# Patient Record
Sex: MALE | Race: WHITE | Employment: FULL TIME | URBAN - METROPOLITAN AREA
[De-identification: names, ages, dates, MRNs, and addresses within clinical notes are randomized per-mention and may not be internally consistent; named-entity substitution may affect disease eponyms.]

---

## 2017-04-18 ENCOUNTER — OFFICE VISIT (OUTPATIENT)
Dept: FAMILY MEDICINE | Facility: CLINIC | Age: 32
End: 2017-04-18
Payer: COMMERCIAL

## 2017-04-18 VITALS
SYSTOLIC BLOOD PRESSURE: 138 MMHG | HEIGHT: 73 IN | HEART RATE: 66 BPM | BODY MASS INDEX: 26.71 KG/M2 | DIASTOLIC BLOOD PRESSURE: 87 MMHG | TEMPERATURE: 97.6 F | WEIGHT: 201.5 LBS | OXYGEN SATURATION: 99 %

## 2017-04-18 DIAGNOSIS — B00.9 HSV (HERPES SIMPLEX VIRUS) INFECTION: ICD-10-CM

## 2017-04-18 DIAGNOSIS — N35.9 URETHRAL STRICTURE, UNSPECIFIED STRICTURE TYPE: ICD-10-CM

## 2017-04-18 DIAGNOSIS — Z00.00 ROUTINE GENERAL MEDICAL EXAMINATION AT A HEALTH CARE FACILITY: Primary | ICD-10-CM

## 2017-04-18 DIAGNOSIS — J30.2 SEASONAL ALLERGIC RHINITIS, UNSPECIFIED ALLERGIC RHINITIS TRIGGER: ICD-10-CM

## 2017-04-18 PROBLEM — Z13.6 CARDIOVASCULAR SCREENING; LDL GOAL LESS THAN 160: Status: ACTIVE | Noted: 2017-04-18

## 2017-04-18 PROCEDURE — 99385 PREV VISIT NEW AGE 18-39: CPT | Performed by: PHYSICIAN ASSISTANT

## 2017-04-18 RX ORDER — FLUTICASONE PROPIONATE 50 MCG
1-2 SPRAY, SUSPENSION (ML) NASAL DAILY
Qty: 1 BOTTLE | Refills: 11 | Status: SHIPPED | OUTPATIENT
Start: 2017-04-18

## 2017-04-18 RX ORDER — ACYCLOVIR 400 MG/1
400 TABLET ORAL 2 TIMES DAILY
Qty: 60 TABLET | Refills: 11 | Status: SHIPPED | OUTPATIENT
Start: 2017-04-18 | End: 2019-12-05

## 2017-04-18 RX ORDER — ACYCLOVIR 400 MG/1
400 TABLET ORAL 2 TIMES DAILY
Refills: 99 | COMMUNITY
Start: 2017-03-24 | End: 2017-04-18

## 2017-04-18 RX ORDER — CETIRIZINE HYDROCHLORIDE 10 MG/1
10 TABLET ORAL EVERY EVENING
Qty: 30 TABLET | Refills: 1 | Status: SHIPPED | OUTPATIENT
Start: 2017-04-18

## 2017-04-18 RX ORDER — OLOPATADINE HYDROCHLORIDE 1 MG/ML
1 SOLUTION/ DROPS OPHTHALMIC 2 TIMES DAILY
Qty: 5 ML | Refills: 11 | Status: SHIPPED | OUTPATIENT
Start: 2017-04-18

## 2017-04-18 NOTE — PATIENT INSTRUCTIONS
Preventive Health Recommendations  Male Ages 26 - 39    Yearly exam:             See your health care provider every year in order to  o   Review health changes.   o   Discuss preventive care.    o   Review your medicines if your doctor has prescribed any.    You should be tested each year for STDs (sexually transmitted diseases), if you re at risk.     After age 35, talk to your provider about cholesterol testing. If you are at risk for heart disease, have your cholesterol tested at least every 5 years.     If you are at risk for diabetes, you should have a diabetes test (fasting glucose).  Shots: Get a flu shot each year. Get a tetanus shot every 10 years.     Nutrition:    Eat at least 5 servings of fruits and vegetables daily.     Eat whole-grain bread, whole-wheat pasta and brown rice instead of white grains and rice.     Talk to your provider about Calcium and Vitamin D.     Lifestyle    Exercise for at least 150 minutes a week (30 minutes a day, 5 days a week). This will help you control your weight and prevent disease.     Limit alcohol to one drink per day.     No smoking.     Wear sunscreen to prevent skin cancer.     See your dentist every six months for an exam and cleaning.   Over the count L-Lysine can help reduce flares of herpes.  Also, avoid any supplements that have L-Arginine, as that can make worse.

## 2017-04-18 NOTE — MR AVS SNAPSHOT
After Visit Summary   4/18/2017    Adarsh Wyatt    MRN: [id number]           Patient Information     Date Of Birth          1985        Visit Information        Provider Department      4/18/2017 2:40 PM Noam Bass PA-C [facility]        Today's Diagnoses     Routine general medical examination at a health care facility    -  1    Urethral stricture, unspecified stricture type        Seasonal allergic rhinitis, unspecified allergic rhinitis trigger        HSV (herpes simplex virus) infection          Care Instructions      Preventive Health Recommendations  Male Ages 26 - 39    Yearly exam:             See your health care provider every year in order to  o   Review health changes.   o   Discuss preventive care.    o   Review your medicines if your doctor has prescribed any.    You should be tested each year for STDs (sexually transmitted diseases), if you re at risk.     After age 35, talk to your provider about cholesterol testing. If you are at risk for heart disease, have your cholesterol tested at least every 5 years.     If you are at risk for diabetes, you should have a diabetes test (fasting glucose).  Shots: Get a flu shot each year. Get a tetanus shot every 10 years.     Nutrition:    Eat at least 5 servings of fruits and vegetables daily.     Eat whole-grain bread, whole-wheat pasta and brown rice instead of white grains and rice.     Talk to your provider about Calcium and Vitamin D.     Lifestyle    Exercise for at least 150 minutes a week (30 minutes a day, 5 days a week). This will help you control your weight and prevent disease.     Limit alcohol to one drink per day.     No smoking.     Wear sunscreen to prevent skin cancer.     See your dentist every six months for an exam and cleaning.   Over the count L-Lysine can help reduce flares of herpes.  Also, avoid any supplements that have L-Arginine, as that can make worse.        Follow-ups after your  "visit        Additional Services     UROLOGY ADULT REFERRAL       Your provider has referred you to: [facility]: [facility]  [place] [phone]   [email or web address]    Please be aware that coverage of these services is subject to the terms and limitations of your health insurance plan.  Call member services at your health plan with any benefit or coverage questions.      Please bring the following with you to your appointment:    (1) Any X-Rays, CTs or MRIs which have been performed.  Contact the facility where they were done to arrange for  prior to your scheduled appointment.    (2) List of current medications  (3) This referral request   (4) Any documents/labs given to you for this referral                  Who to contact     If you have questions or need follow up information about today's clinic visit or your schedule please contact [facility] directly at [phone].  Normal or non-critical lab and imaging results will be communicated to you by MyChart, letter or phone within 4 business days after the clinic has received the results. If you do not hear from us within 7 days, please contact the clinic through [employer] or phone. If you have a critical or abnormal lab result, we will notify you by phone as soon as possible.  Submit refill requests through [employer] or call your pharmacy and they will forward the refill request to us. Please allow 3 business days for your refill to be completed.          Additional Information About Your Visit        MyChart Information     [employer] lets you send messages to your doctor, view your test results, renew your prescriptions, schedule appointments and more. To sign up, go to [email or web address] . Click on \"Log in\" on the left side of the screen, which will take you to the Welcome page. Then click on \"Sign up Now\" on the right side of the page.     You will be asked to enter the access code listed below, as well as some personal " "information. Please follow the directions to create your username and password.     Your access code is: [email or web address]  Expires: 2017  3:25 PM     Your access code will  in 90 days. If you need help or a new code, please call your [facility] clinic or [phone].        Care EveryWhere ID     This is your Care EveryWhere ID. This could be used by other organizations to access your [facility] medical records  [id number]        Your Vitals Were     Pulse Temperature Height Pulse Oximetry BMI (Body Mass Index)       66 97.6  F (36.4  C) (Oral) 6' 1\" (1.854 m) 99% 26.58 kg/m2        Blood Pressure from Last 3 Encounters:   17 138/87    Weight from Last 3 Encounters:   17 201 lb 8 oz (91.4 kg)              We Performed the Following     UROLOGY ADULT REFERRAL          Today's Medication Changes          These changes are accurate as of: 17  3:25 PM.  If you have any questions, ask your nurse or doctor.               Start taking these medicines.        Dose/Directions    cetirizine 10 MG tablet   Commonly known as:  zyrTEC   Used for:  Seasonal allergic rhinitis, unspecified allergic rhinitis trigger   Started by:  Noam Bass PA-C        Dose:  10 mg   Take 1 tablet (10 mg) by mouth every evening   Quantity:  30 tablet   Refills:  1       fluticasone 50 MCG/ACT spray   Commonly known as:  FLONASE   Used for:  Seasonal allergic rhinitis, unspecified allergic rhinitis trigger   Started by:  Noam Bass PA-C        Dose:  1-2 spray   Spray 1-2 sprays into both nostrils daily   Quantity:  1 Bottle   Refills:  11       olopatadine 0.1 % ophthalmic solution   Commonly known as:  PATANOL   Used for:  Seasonal allergic rhinitis, unspecified allergic rhinitis trigger   Started by:  Noam Bass PA-C        Dose:  1 drop   Place 1 drop into both eyes 2 times daily   Quantity:  5 mL   Refills:  11            Where to get your medicines      These medications were sent to " [facility]/pharmacy #[id number] - [place], [place] - [address]  [address] [address]     Phone:  [phone]     acyclovir 400 MG tablet    cetirizine 10 MG tablet    fluticasone 50 MCG/ACT spray    olopatadine 0.1 % ophthalmic solution                Primary Care Provider    None Specified       No primary provider on file.        Thank you!     Thank you for choosing [facility]  for your care. Our goal is always to provide you with excellent care. Hearing back from our patients is one way we can continue to improve our services. Please take a few minutes to complete the written survey that you may receive in the mail after your visit with us. Thank you!             Your Updated Medication List - Protect others around you: Learn how to safely use, store and throw away your medicines at www.disposemymeds.org.          This list is accurate as of: 4/18/17  3:25 PM.  Always use your most recent med list.                   Brand Name Dispense Instructions for use    acyclovir 400 MG tablet    ZOVIRAX    60 tablet    Take 1 tablet (400 mg) by mouth 2 times daily       cetirizine 10 MG tablet    zyrTEC    30 tablet    Take 1 tablet (10 mg) by mouth every evening       fluticasone 50 MCG/ACT spray    FLONASE    1 Bottle    Spray 1-2 sprays into both nostrils daily       olopatadine 0.1 % ophthalmic solution    PATANOL    5 mL    Place 1 drop into both eyes 2 times daily

## 2017-04-18 NOTE — PROGRESS NOTES
SUBJECTIVE:     CC: Adarsh Wyatt is an 31 year old male who presents for preventative health visit.     Healthy Habits:    Do you get at least three servings of calcium containing foods daily (dairy, green leafy vegetables, etc.)? yes    Amount of exercise or daily activities, outside of work: 5 day(s) per week 1 hr     Problems taking medications regularly No    Medication side effects: No    Have you had an eye exam in the past two years? yes    Do you see a dentist twice per year? 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  He does struggle with allergies, tends to be different at different times of year.  He has had since a youth.  He has used antihistamines in the past with decent success.  Eyes are a big symptoms.  Some success with eye drops.  Has also used steroid nose sprays.      He has also noticed that over the last couple of years, he has some increased urination.  He may have a little difficulty with starting stream and some slight pain.  He did have evaluation for this a few years back with urology, and there was concern for urethral stricture.  He did have catheter placed prior to that, so they though it may have just been irritation.  Symptoms have never really gone away.     He has known genital herpes and has been on valtrex daily for suppression.  He recently did get all STD testing.      Today's PHQ-2 Score:   PHQ-2 ( 1999 Pfizer) 4/18/2017   Q1: Little interest or pleasure in doing things 0   Q2: Feeling down, depressed or hopeless 0   PHQ-2 Score 0       Abuse: Current or Past(Physical, Sexual or Emotional)- No  Do you feel safe in your environment - Yes    Social History   Substance Use Topics     Smoking status: Never Smoker     Smokeless tobacco: Not on file     Alcohol use No     The patient does not drink >3 drinks per day nor >7 drinks per week.    Last PSA: No results found for: PSA    No results for  "input(s): CHOL, HDL, LDL, TRIG, CHOLHDLRATIO, NHDL in the last 07185 hours.    Reviewed orders with patient. Reviewed health maintenance and updated orders accordingly - Yes    Reviewed and updated as needed this visit by clinical staff  Tobacco  Allergies  Meds  Med Hx  Surg Hx  Fam Hx  Soc Hx        Reviewed and updated as needed this visit by Provider            ROS:  C: NEGATIVE for fever, chills, change in weight  I: NEGATIVE for worrisome rashes, moles or lesions  E: NEGATIVE for vision changes or irritation  ENT: NEGATIVE for ear, mouth and throat problems  R: NEGATIVE for significant cough or SOB  CV: NEGATIVE for chest pain, palpitations or peripheral edema  GI: NEGATIVE for nausea, abdominal pain, heartburn, or change in bowel habits   male: negative for dysuria, hematuria, decreased urinary stream, erectile dysfunction, urethral discharge  M: NEGATIVE for significant arthralgias or myalgia  N: NEGATIVE for weakness, dizziness or paresthesias  P: NEGATIVE for changes in mood or affect    Problem list, Medication list, Allergies, and Medical/Social/Surgical histories reviewed in EPIC and updated as appropriate.  BP Readings from Last 3 Encounters:   04/18/17 138/87    Wt Readings from Last 3 Encounters:   04/18/17 201 lb 8 oz (91.4 kg)                  OBJECTIVE:     /87  Pulse 66  Temp 97.6  F (36.4  C) (Oral)  Ht 6' 1\" (1.854 m)  Wt 201 lb 8 oz (91.4 kg)  SpO2 99%  BMI 26.58 kg/m2  EXAM:  GENERAL: alert and no distress  EYES: Eyes grossly normal to inspection, PERRL and conjunctivae and sclerae normal  HENT: normal cephalic/atraumatic, ear canals and TM's normal, nasal mucosa edematous , rhinorrhea clear, oropharynx clear and oral mucous membranes moist  NECK: no adenopathy, no asymmetry, masses, or scars and thyroid normal to palpation  RESP: lungs clear to auscultation - no rales, rhonchi or wheezes  CV: regular rate and rhythm, normal S1 S2, no S3 or S4, no murmur, click or rub, no " "peripheral edema and peripheral pulses strong  ABDOMEN: soft, nontender, no hepatosplenomegaly, no masses and bowel sounds normal   (male): normal male genitalia without lesions or urethral discharge, no hernia  MS: no gross musculoskeletal defects noted, no edema  SKIN: no suspicious lesions or rashes  NEURO: Normal strength and tone, mentation intact and speech normal  PSYCH: mentation appears normal, affect normal/bright    ASSESSMENT/PLAN:     1. Routine general medical examination at a health care facility      2. Urethral stricture, unspecified stricture type  Will get further evaluation from urology.  - UROLOGY ADULT REFERRAL    3. Seasonal allergic rhinitis, unspecified allergic rhinitis trigger    - fluticasone (FLONASE) 50 MCG/ACT spray; Spray 1-2 sprays into both nostrils daily  Dispense: 1 Bottle; Refill: 11  - olopatadine (PATANOL) 0.1 % ophthalmic solution; Place 1 drop into both eyes 2 times daily  Dispense: 5 mL; Refill: 11  - cetirizine (ZYRTEC) 10 MG tablet; Take 1 tablet (10 mg) by mouth every evening  Dispense: 30 tablet; Refill: 1    4. HSV (herpes simplex virus) infection    - acyclovir (ZOVIRAX) 400 MG tablet; Take 1 tablet (400 mg) by mouth 2 times daily  Dispense: 60 tablet; Refill: 11    COUNSELING:  Reviewed preventive health counseling, as reflected in patient instructions       Regular exercise       Healthy diet/nutrition    BP Screening:   Last 3 BP Readings:    BP Readings from Last 3 Encounters:   04/18/17 138/87       The following was recommended to the patient:  Re-screen BP within a year and recommended lifestyle modifications     reports that he has never smoked. He does not have any smokeless tobacco history on file.    Estimated body mass index is 26.58 kg/(m^2) as calculated from the following:    Height as of this encounter: 6' 1\" (1.854 m).    Weight as of this encounter: 201 lb 8 oz (91.4 kg).   Weight management plan: Discussed healthy diet and exercise guidelines and " patient will follow up in 12 months in clinic to re-evaluate.    Counseling Resources:  ATP IV Guidelines  Pooled Cohorts Equation Calculator  FRAX Risk Assessment  ICSI Preventive Guidelines  Dietary Guidelines for Americans, 2010  USDA's MyPlate  ASA Prophylaxis  Lung CA Screening    Noam Bass PA-C  [facility]

## 2017-04-18 NOTE — NURSING NOTE
"Chief Complaint   Patient presents with     Physical     not fasting     /87  Pulse 66  Temp 97.6  F (36.4  C) (Oral)  Ht 6' 1\" (1.854 m)  Wt 201 lb 8 oz (91.4 kg)  SpO2 99%  BMI 26.58 kg/m2 Estimated body mass index is 26.58 kg/(m^2) as calculated from the following:    Height as of this encounter: 6' 1\" (1.854 m).    Weight as of this encounter: 201 lb 8 oz (91.4 kg).  BP completed using cuff size: regular       Health Maintenance due pending provider review:  NONE    n/a      Bere Jordan CMA  "

## 2019-12-05 DIAGNOSIS — B00.9 HSV (HERPES SIMPLEX VIRUS) INFECTION: ICD-10-CM

## 2019-12-05 RX ORDER — ACYCLOVIR 400 MG/1
400 TABLET ORAL 2 TIMES DAILY
Qty: 60 TABLET | Refills: 0 | Status: SHIPPED | OUTPATIENT
Start: 2019-12-05

## 2019-12-05 NOTE — TELEPHONE ENCOUNTER
JS,   Please see below message  Pt has future appt    Next 5 appointments (look out 90 days)    Jan 14, 2020  9:40 AM CST  Office Visit with Noam Bass PA-C  [facility] ([facility]) [address]  [facility] [id number] [phone]        Please advise on refill if appropriate  Last OV 4/18/2017  Thanks,  Maria Isabel MARLEY RN

## 2019-12-05 NOTE — TELEPHONE ENCOUNTER
Reason for Call:  Medication or medication refill:    Do you use a [facility]?  Name of the pharmacy and phone number for the current request:     [facility] [id number] [place] [address]    Name of the medication requested: acyclovir (ZOVIRAX) 400 MG tablet     Other request: Pt is leaving out of town on 12.08.19 and needs a refill before he goes. No OV available. Has an upcoming priya't on 01.14.20 and would like a refill to get him to that priya't    Can we leave a detailed message on this number? YES    Phone number patient can be reached at: Cell number on file:    Telephone Information:   Mobile [phone]     Best Time: any    Call taken on 12/5/2019 at 1:27 PM by Mariela Hays

## 2021-05-29 ENCOUNTER — RECORDS - HEALTHEAST (OUTPATIENT)
Dept: ADMINISTRATIVE | Facility: CLINIC | Age: 36
End: 2021-05-29

## 2021-05-30 ENCOUNTER — RECORDS - HEALTHEAST (OUTPATIENT)
Dept: ADMINISTRATIVE | Facility: CLINIC | Age: 36
End: 2021-05-30

## 2021-06-02 ENCOUNTER — RECORDS - HEALTHEAST (OUTPATIENT)
Dept: ADMINISTRATIVE | Facility: CLINIC | Age: 36
End: 2021-06-02